# Patient Record
Sex: MALE | Race: BLACK OR AFRICAN AMERICAN | NOT HISPANIC OR LATINO | Employment: FULL TIME | URBAN - METROPOLITAN AREA
[De-identification: names, ages, dates, MRNs, and addresses within clinical notes are randomized per-mention and may not be internally consistent; named-entity substitution may affect disease eponyms.]

---

## 2019-04-10 ENCOUNTER — HOSPITAL ENCOUNTER (EMERGENCY)
Facility: HOSPITAL | Age: 34
Discharge: HOME | End: 2019-04-11
Attending: EMERGENCY MEDICINE

## 2019-04-10 DIAGNOSIS — M79.18 MUSCULOSKELETAL PAIN: Primary | ICD-10-CM

## 2019-04-10 DIAGNOSIS — R07.81 RIB PAIN ON RIGHT SIDE: ICD-10-CM

## 2019-04-10 PROCEDURE — 99284 EMERGENCY DEPT VISIT MOD MDM: CPT | Mod: 25

## 2019-04-10 PROCEDURE — 96372 THER/PROPH/DIAG INJ SC/IM: CPT

## 2019-04-10 RX ORDER — KETOROLAC TROMETHAMINE 30 MG/ML
10 INJECTION, SOLUTION INTRAMUSCULAR; INTRAVENOUS
Status: COMPLETED | OUTPATIENT
Start: 2019-04-11 | End: 2019-04-11

## 2019-04-11 VITALS
RESPIRATION RATE: 19 BRPM | BODY MASS INDEX: 23.03 KG/M2 | DIASTOLIC BLOOD PRESSURE: 71 MMHG | HEART RATE: 86 BPM | WEIGHT: 170 LBS | OXYGEN SATURATION: 97 % | TEMPERATURE: 98 F | SYSTOLIC BLOOD PRESSURE: 108 MMHG | HEIGHT: 72 IN

## 2019-04-11 PROCEDURE — 63600175 PHARM REV CODE 636 W HCPCS: Performed by: PHYSICIAN ASSISTANT

## 2019-04-11 PROCEDURE — 96372 THER/PROPH/DIAG INJ SC/IM: CPT

## 2019-04-11 RX ORDER — NAPROXEN 500 MG/1
500 TABLET ORAL 2 TIMES DAILY WITH MEALS
Qty: 20 TABLET | Refills: 0 | Status: SHIPPED | OUTPATIENT
Start: 2019-04-11 | End: 2021-11-28

## 2019-04-11 RX ORDER — LIDOCAINE 50 MG/G
OINTMENT TOPICAL
Qty: 50 G | Refills: 0 | Status: SHIPPED | OUTPATIENT
Start: 2019-04-11 | End: 2021-11-28

## 2019-04-11 RX ADMIN — KETOROLAC TROMETHAMINE 10 MG: 30 INJECTION, SOLUTION INTRAMUSCULAR at 12:04

## 2019-04-11 NOTE — ED PROVIDER NOTES
Encounter Date: 4/10/2019       History     Chief Complaint   Patient presents with    rib pain     reports lifting heavy boxes at work earlier tonight and now pain has gotten worse along Right ribs and into R axillary region, he reports not being able to lift R arm      Patient is a 34-year-old male presenting to the ER for evaluation of right-sided rib pain.  Patient states he was at work lifting heavy boxes when he developed pain to the right chest and rib region.  Patient states that the pain worsens upon inspiration or movement.  He states that also worsens upon palpation of the site.  He states that the right side of his shoulder and chest region became numb for a few moments.  He denies any cough congestion URI symptoms. No fever chills. Denies any shortness of breath. No prior cardiac history.  No recent antibiotic use.  He denies prior history of asthma or COPD.  He is a current everyday smoker.  Denies any abdominal pain, nausea vomiting.  Has not had any dysuria hematuria.  States he has had a history of kidney stones but this feels different.  He denies any injury trauma or fall.  Has not noticed any rashes.    The history is provided by the patient.     Review of patient's allergies indicates:  No Known Allergies  History reviewed. No pertinent past medical history.  History reviewed. No pertinent surgical history.  No family history on file.  Social History     Tobacco Use    Smoking status: Current Every Day Smoker     Packs/day: 0.50     Types: Cigarettes   Substance Use Topics    Alcohol use: No    Drug use: No     Review of Systems   Constitutional: Negative for chills and fever.   HENT: Negative for congestion.    Respiratory: Negative for cough and shortness of breath.    Cardiovascular: Negative for chest pain and palpitations.   Gastrointestinal: Negative for abdominal pain, nausea and vomiting.   Genitourinary: Negative for dysuria, enuresis and hematuria.   Musculoskeletal: Positive for  myalgias (right rib). Negative for back pain.   Skin: Negative for rash and wound.   Allergic/Immunologic: Negative for immunocompromised state.   Neurological: Negative for weakness.   Hematological: Does not bruise/bleed easily.   Psychiatric/Behavioral: Negative for confusion.       Physical Exam     Initial Vitals [04/10/19 2340]   BP Pulse Resp Temp SpO2   (!) 133/94 86 15 98 °F (36.7 °C) 99 %      MAP       --         Physical Exam    Vitals reviewed.  Constitutional: He appears well-developed and well-nourished. He is not diaphoretic. No distress.   HENT:   Head: Normocephalic and atraumatic.   Mouth/Throat: Oropharynx is clear and moist.   Eyes: Conjunctivae and EOM are normal.   Neck: Neck supple.   Cardiovascular: Normal rate, regular rhythm and normal heart sounds.   Pulmonary/Chest: No respiratory distress. He has no decreased breath sounds. He has no wheezes. He exhibits tenderness (Tenderness on palpation to the posterior and lateral aspect of the right chest walll). He exhibits no edema, no deformity, no swelling and no retraction.   Abdominal: Soft. Normal appearance. He exhibits no distension. There is no tenderness.   Neurological: He is alert and oriented to person, place, and time.   Skin: Skin is warm and dry.         ED Course   Procedures  Labs Reviewed - No data to display       Imaging Results          X-Ray Chest PA And Lateral (Final result)  Result time 04/11/19 00:55:47    Final result by Elsi Vickers MD (04/11/19 00:55:47)                 Impression:      No acute intrathoracic process identified.      Electronically signed by: Elsi Vickers MD  Date:    04/11/2019  Time:    00:55             Narrative:    EXAMINATION:  XR CHEST PA AND LATERAL    CLINICAL HISTORY:  Pleurodynia    TECHNIQUE:  PA and lateral views of the chest were performed.    COMPARISON:  None    FINDINGS:  Cardiac silhouette is normal in size.  Lungs are symmetrically expanded.  No evidence of focal  consolidative process, pneumothorax, or significant effusion.  No acute osseous abnormality identified.                                       APC / Resident Notes:   Patient seen in the ER promptly upon arrival.  He is afebrile, no acute distress.  Physical examination reveals tenderness on palpation to the posterior, lateral right rib region.  There is no deformity or retractions noted.  No ecchymosis or bruising noted. Heart lung sounds normal.  Abdomen soft, nondistended, nontender. No CVA tenderness noted.  Patient given Toradol for discomfort.  X-ray of chest obtained. No acute abnormality noted.    Given patient's mechanism of injury this is likely secondary to muscular strain.  Patient will be prescribed home on naproxen to use as directed.  Advised to use lidocaine cream over the site.  Patient advised to reduce heavy lifting or strenuous activity.  He is to follow up with family doctor this week.  Stable for discharge and close follow-up.                 Clinical Impression:       ICD-10-CM ICD-9-CM   1. Musculoskeletal pain M79.18 729.1   2. Rib pain on right side R07.81 786.50         Disposition:   Disposition: Discharged  Condition: Stable                        Tootie Mills PA-C  04/11/19 0133

## 2019-04-11 NOTE — ED TRIAGE NOTES
History reviewed. No pertinent past medical history.    Patient arrived to ED per EMS with c/o right rib pain secondary to lifting heavy box at work and felt a sharp pain in his right side and pain became progressively worse.  Area tender to palpation with no obvious deformity noted.  Lungs clear and equal bilaterally

## 2019-04-11 NOTE — DISCHARGE INSTRUCTIONS
Please take medication as prescribed.  Use lidocaine cream over the back and chest region.  Avoid heavy lifting or strenuous activity.  Follow up with family doctor this week.

## 2021-11-28 ENCOUNTER — OFFICE VISIT (OUTPATIENT)
Dept: URGENT CARE | Facility: CLINIC | Age: 36
End: 2021-11-28

## 2021-11-28 VITALS
BODY MASS INDEX: 23.03 KG/M2 | DIASTOLIC BLOOD PRESSURE: 92 MMHG | SYSTOLIC BLOOD PRESSURE: 126 MMHG | OXYGEN SATURATION: 95 % | HEART RATE: 114 BPM | WEIGHT: 170 LBS | TEMPERATURE: 100 F | RESPIRATION RATE: 18 BRPM | HEIGHT: 72 IN

## 2021-11-28 DIAGNOSIS — K08.89 TOOTH PAIN: ICD-10-CM

## 2021-11-28 DIAGNOSIS — K04.7 DENTAL ABSCESS: Primary | ICD-10-CM

## 2021-11-28 PROCEDURE — 99214 OFFICE O/P EST MOD 30 MIN: CPT | Mod: TIER,S$GLB,, | Performed by: NURSE PRACTITIONER

## 2021-11-28 PROCEDURE — 99214 PR OFFICE/OUTPT VISIT, EST, LEVL IV, 30-39 MIN: ICD-10-PCS | Mod: TIER,S$GLB,, | Performed by: NURSE PRACTITIONER

## 2021-11-28 RX ORDER — AMOXICILLIN AND CLAVULANATE POTASSIUM 875; 125 MG/1; MG/1
1 TABLET, FILM COATED ORAL EVERY 12 HOURS
Qty: 20 TABLET | Refills: 0 | Status: SHIPPED | OUTPATIENT
Start: 2021-11-28 | End: 2021-12-08

## 2021-11-28 RX ORDER — ACETAMINOPHEN AND CODEINE PHOSPHATE 300; 30 MG/1; MG/1
1 TABLET ORAL EVERY 8 HOURS PRN
Qty: 12 TABLET | Refills: 0 | Status: SHIPPED | OUTPATIENT
Start: 2021-11-28 | End: 2021-11-28

## 2023-04-24 ENCOUNTER — HOSPITAL ENCOUNTER (EMERGENCY)
Facility: HOSPITAL | Age: 38
Discharge: LEFT BEFORE DISCHARGE | End: 2023-04-24
Attending: EMERGENCY MEDICINE

## 2023-04-24 VITALS
OXYGEN SATURATION: 100 % | RESPIRATION RATE: 16 BRPM | HEIGHT: 72 IN | DIASTOLIC BLOOD PRESSURE: 79 MMHG | TEMPERATURE: 98 F | HEART RATE: 84 BPM | BODY MASS INDEX: 23.03 KG/M2 | WEIGHT: 170 LBS | SYSTOLIC BLOOD PRESSURE: 114 MMHG

## 2023-04-24 DIAGNOSIS — R40.20 LOSS OF CONSCIOUSNESS: Primary | ICD-10-CM

## 2023-04-24 PROCEDURE — 99283 EMERGENCY DEPT VISIT LOW MDM: CPT

## 2023-04-24 NOTE — ED NOTES
After being advised on many risks of leaving AMA, pt deciding to leave anyway's. MD aware. Pt signed AMA form.

## 2023-04-24 NOTE — ED TRIAGE NOTES
Pt to ED via EMS. 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 When EMS arrived he had pinpoint pupils. He was loaded on stretcher and in truck and was given 4mg narcan IN. He had a positive response and became alert and oriented after. He denied using any drugs but ems reports that a short straw in a cigarette box was found on him on scene. NADN, vitals WNL. Placed in [facility] for eval.

## 2023-04-24 NOTE — ED PROVIDER NOTES
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 When Ems arrived he had pinpoint pupils. He was loaded on stretcher and in truck and was given 4mg narcan IN. He had a positive response and became alert and oriented after. He denied using any drugs but ems reports that a short straw in a cigarette box was found on him on scene.      38-year-old male who denies past medical history presents with EMS for possible overdose.  The patient reports he went outside and was using a weed whacker when he went unresponsive.  He does not recall this, he woke up in the ambulance.  Currently denies any physical complaints.    Per EMS, the patient was found with pinpoint pupils and had a positive response to Narcan.  The patient specifically denies use of any pain medication, heroin or fentanyl.  He declines workup in the ED because he states he "is perfectly fine."    The history is provided by the patient and the EMS personnel.   Review of patient's allergies indicates:  No Known Allergies  No past medical history on file.  No past surgical history on file.  No family history on file.  Social History     Tobacco Use    Smoking status: Every Day     Packs/day: 0.50     Types: Cigarettes    Smokeless tobacco: Never   Substance Use Topics    Alcohol use: No    Drug use: No     Review of Systems   Constitutional:  Negative for fever.   HENT:  Negative for facial swelling.    Respiratory:  Negative for shortness of breath.    Cardiovascular:  Negative for chest pain.   Genitourinary:  Negative for difficulty urinating.   Allergic/Immunologic: Negative for immunocompromised state.   Neurological:  Positive for syncope.     Physical Exam     Initial Vitals [04/24/23 1630]   BP Pulse Resp Temp SpO2   114/79 84 16 97.5 °F (36.4 °C) 100 %      MAP       --         Physical " Exam    Constitutional: He appears well-developed and well-nourished. He is not diaphoretic. No distress.   Patient in work clothes, alert and oriented x 3   HENT:   Head: Normocephalic and atraumatic.   Eyes: Conjunctivae are normal.   Pulmonary/Chest: No respiratory distress.     Neurological: He is alert and oriented to person, place, and time. GCS score is 15. GCS eye subscore is 4. GCS verbal subscore is 5. GCS motor subscore is 6.   Psychiatric: He has a normal mood and affect.       ED Course   Procedures  Labs Reviewed - No data to display       Imaging Results    None          Medications - No data to display  Medical Decision Making:   Initial Assessment:   38-year-old male presents with EMS after possible overdose.  He denies drug use.  He does not recall events.  He is currently alert, oriented x3 and declining all ED interventions or workup.    The patient has decision making capacity . Patient has chosen to refuse medical evaluation/treatment and is able to communicate this verbally. Patient understands the relative risks, benefits, alternatives and consequences. Patient is able to reason, gives an adequate explanation of why he refuses evaluation/treatment.  I specifically reviewed risks of leaving after getting Narcan including recurrence of symptoms, death or permanent disability.                            Clinical Impression:   Final diagnoses:  [R40.20] Loss of consciousness (Primary)     This dictation has been generated using M-Modal Fluency Direct dictation; some phonetic errors may occur.      ED Disposition Condition    AMA Stable                Keli Moore MD  04/24/23 8840